# Patient Record
Sex: FEMALE | Race: BLACK OR AFRICAN AMERICAN
[De-identification: names, ages, dates, MRNs, and addresses within clinical notes are randomized per-mention and may not be internally consistent; named-entity substitution may affect disease eponyms.]

---

## 2020-04-25 ENCOUNTER — NURSE TRIAGE (OUTPATIENT)
Dept: OTHER | Facility: CLINIC | Age: 85
End: 2020-04-25

## 2020-04-25 NOTE — TELEPHONE ENCOUNTER
Spoke with granddaughter, Carlos Cho, who is present with patient. Reason for Disposition   [1] SEVERE weakness (i.e., unable to walk or barely able to walk, requires support) AND     [2] new onset or worsening    Answer Assessment - Initial Assessment Questions  1. DESCRIPTION: \"Describe how you are feeling. \"      \"Generall a little more weak than normal. She fell this morning. I received a call around 11:30am from my uncle saying the medic alert had notified him. \"    2. SEVERITY: \"How bad is it? \"  \"Can you stand and walk? \"    - MILD - Feels weak or tired, but does not interfere with work, school or normal activities    - [facility] to stand and walk; weakness interferes with work, school, or normal activities      - SEVERE - Unable to stand or walk      \"When speaking with the officer, they placed her in the bed. After her fall, she was having stiffness in her left knee. She had knee surgery a while ago. She has light scrapping on both knees from the fall. She did not want to go to the hospital. She said she didn't feel that bad. \"    3. ONSET:  \"When did the weakness begin? \"      \"Her knee has always been weak but she's still able to get around\"    4. CAUSE: \"What do you think is causing the weakness? \"      \"When she fell, the knee is sore from the fall\"    5. MEDICINES: Marie Meyer you recently started a new medicine or had a change in the amount of a medicine? \"      \"She's been having an ongoing issue with her knee. She's been seeing her knee for it. She's been on several medications for the knee but nothing seems to be working for the pain. \"    6. OTHER SYMPTOMS: \"Do you have any other symptoms? \" (e.g., chest pain, fever, cough, SOB, vomiting, diarrhea, bleeding, other areas of pain)  \"Brusing on her arms, left more than right. I don't know if it's from them helping her up or not. It looks like maybe three circles\"    7. PREGNANCY: \"Is there any chance you are pregnant? \" \"When was your last menstrual period? \" n/a    Protocols used: WEAKNESS (GENERALIZED) AND FATIGUE-ADULT-    Discussed recommendations and care advice with patient's granddaughter. She verbalizes her understanding and will proceed with care advice given.